# Patient Record
(demographics unavailable — no encounter records)

---

## 2025-05-08 NOTE — PROCEDURE
[FreeTextEntry1] : Excision of Anal Tag  Pre-procedure Diagnosis: Anal skin tag Post-procedure Diagnosis: Anal skin tag Procedure: Anoscopy with excision of anal skin tag Anesthesia: 1% Lidocaine with epinephrine Estimated blood loss: minimal Specimen: anal skin tag Drain: none Complications: none  Indications: Patient presents with history of an anal tag causing persistent symptoms. On physical exam, an anal skin tag was noted. Risks/benefits/alternatives were discussed and patient agreed to proceed with office based procedure.  Procedure Details: Consent obtained. Patient was placed in a modified prone amena-knife position on the examination table. Upon inspection, an anal skin tag was noted in the right posterior position. Anoscopy was performed and no internal hemorrhoidal inflammation noted. Betadine was used to clean the perianal skin and the associated skin was injected with local anesthetic. The tag was identified and excised and sent to pathology as specimen. The wound defect was narrow and hemostasis was achieved with electrocautery.  A dressing was applied. The patient tolerated the procedure well.

## 2025-05-08 NOTE — ASSESSMENT
[FreeTextEntry1] : Exam findings and diagnosis were discussed at length with patient. Discussed management options, including supportive care.  Patient is interested in excision of the residual anal skin tag. Discussed office based procedure under local anesthesia. The nature of the procedure as well as risks and benefits were reviewed with the patient. Risk/benefits discussed including but not limited to pain, bleeding, infection, swelling, recurrence of symptoms, scarring and risk of anal fissure formation.  Postprocedural expectations regarding pain, wound cosmesis, and wound healing was discussed.   All questions were answered, patient expressed understanding, and would like to proceed with the proposed office procedure - performed today. Post-procedure instructions were reviewed with the patient, including pain control and local wound care.  F/u 2 weeks for wound check. All questions answered.

## 2025-05-08 NOTE — PHYSICAL EXAM
[FreeTextEntry1] : Medical assistant present for duration of physical examination   General no acute distress, alert and oriented Psych calm, pleasant demeanor, responding appropriately to questions Nonlabored breathing Ambulating without assistance Skin normal color and pigment, no visible lesions or rashes    Anorectal Exam: Inspection no erythema, induration or fluctuance, no skin excoriation, no fissure, small anal skin tag right posterior, no external hemorrhoidal inflammation or thrombosis YIN nontender, no masses palpated, no blood on gloved finger

## 2025-05-08 NOTE — HISTORY OF PRESENT ILLNESS
[FreeTextEntry1] : 32 yo F presents for initial evaluation.  Referred by: GI Dr. Laurie Obrien Referral Reason: "skin tag of perianal region"  PMH: anxiety, depression, osteomyelitis R knee PSH: appendectomy  Medications: fluoxetine  Allergies: NKDA  Social history: Social ETOH  FH: denies CRC/ IBD Denies prior colonoscopy  Patient presents for initial evaluation. Around 6 months ago developed palpable bump on posterior anal area. Feels it has gradually increased in size over the past few weeks. Seen by GI and referred for further evaluation. Denies rectal itching, burning, bleeding, pain.  BH: 1x/ day, soft formed, no fiber supplements or stool softeners  No AC/ NSAIDS use

## 2025-06-04 NOTE — ASSESSMENT
[FreeTextEntry1] : Currently asymptomatic. Pathology results reviewed. Continue supportive F/u as needed All questions answered

## 2025-06-04 NOTE — HISTORY OF PRESENT ILLNESS
[FreeTextEntry1] : 34 y/o F presents for follow up   PMH: anxiety, depression, osteomyelitis R knee PSH: appendectomy  Medications: fluoxetine Allergies: NKDA  Social history: Social ETOH  FH: denies CRC/ IBD Denies prior colonoscopy  Seen for initial visit on 05/08/2025 with c/o palpable bump x6 months Exam noted small anal skin tag right posterior, no external hemorrhoidal inflammation or thrombosis s/p in office excision  Pathology: - Fibroepithelial polyp.  Patient presents today for post procedure follow up  Overall she feels well.  Reports bleeding for first 24 hours but eventually resolved.  Currently denies any rectal pain or bleeding.  Bowel movement is daily. Denies constipation. She is not taking any medications to assist with moving bowels

## 2025-06-04 NOTE — HISTORY OF PRESENT ILLNESS
[FreeTextEntry1] : 32 y/o F presents for follow up   PMH: anxiety, depression, osteomyelitis R knee PSH: appendectomy  Medications: fluoxetine Allergies: NKDA  Social history: Social ETOH  FH: denies CRC/ IBD Denies prior colonoscopy  Seen for initial visit on 05/08/2025 with c/o palpable bump x6 months Exam noted small anal skin tag right posterior, no external hemorrhoidal inflammation or thrombosis s/p in office excision  Pathology: - Fibroepithelial polyp.  Patient presents today for post procedure follow up  Overall she feels well.  Reports bleeding for first 24 hours but eventually resolved.  Currently denies any rectal pain or bleeding.  Bowel movement is daily. Denies constipation. She is not taking any medications to assist with moving bowels

## 2025-06-04 NOTE — PHYSICAL EXAM
[FreeTextEntry1] : Medical assistant present for duration of physical examination   General no acute distress, alert and oriented Psych in good spirits, responding appropriately to questions Nonlabored breathing Ambulating without assistance    Anorectal Exam: Inspection no cellulitis or skin changes, no fissure, no discharge or drainage, no anal skin tags YIN nontender, no masses palpated, no blood on gloved finger, no fluctuance  Patient tolerated examination well